# Patient Record
Sex: MALE | Race: WHITE
[De-identification: names, ages, dates, MRNs, and addresses within clinical notes are randomized per-mention and may not be internally consistent; named-entity substitution may affect disease eponyms.]

---

## 2019-04-15 ENCOUNTER — HOSPITAL ENCOUNTER (OUTPATIENT)
Dept: HOSPITAL 61 - KCIC | Age: 39
Discharge: HOME | End: 2019-04-15
Attending: PHYSICIAN ASSISTANT
Payer: OTHER GOVERNMENT

## 2019-04-15 DIAGNOSIS — Z87.891: ICD-10-CM

## 2019-04-15 DIAGNOSIS — S46.011A: ICD-10-CM
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
## 2019-04-15 DIAGNOSIS — M75.41: ICD-10-CM

## 2019-04-15 DIAGNOSIS — M75.51: ICD-10-CM
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
## 2019-04-15 DIAGNOSIS — S43.432A: ICD-10-CM
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
## 2019-04-15 DIAGNOSIS — S43.431A: Primary | ICD-10-CM

## 2019-04-15 DIAGNOSIS — M75.21: ICD-10-CM

## 2019-04-15 PROCEDURE — 73040 CONTRAST X-RAY OF SHOULDER: CPT

## 2019-04-15 PROCEDURE — 23350 INJECTION FOR SHOULDER X-RAY: CPT

## 2019-04-15 PROCEDURE — A9585 GADOBUTROL INJECTION: HCPCS

## 2019-04-15 PROCEDURE — 73222 MRI JOINT UPR EXTREM W/DYE: CPT

## 2019-04-15 NOTE — [facility]
Clinical indications: Left shoulder pain. Recurrent left shoulder 

dislocations. Decreased range of motion.

 

FLUOROSCOPIC GUIDED LEFT SHOULDER ARTHROGRAM INJECTION

 

Procedure: The procedure and possible complications including bleeding and

infection were explained. The patient provided both verbal and written 

consent. A timeout was performed confirming the name of the patient and 

date of birth and the procedure and side of the procedure. The anterior 

aspect of the left shoulder was prepped and draped in the usual sterile 

fashion with ChloraPrep. A total of 3 cc of 1% lidocaine was utilized for 

local anesthesia. Using sterile technique and fluoroscopic guidance, a 

22-gauge spinal needle was directed into the left glenohumeral joint from 

an anterior approach. A solution containing 10 cc of sterile normal saline

and 5 cc of Omnipaque 300 and 5 cc of 1% lidocaine and 0.1 cc of Gadavist 

was injected intra-articularly under fluoroscopic observation and a 

fluoroscopic spot view of the left shoulder was performed which confirmed 

intra-articular position of the contrast. The needle was removed and 

hemostasis was adequate. A sterile bandage was applied to the puncture 

site. One more fluoroscopic spot view of the left shoulder was performed. 

The patient tolerated the procedure well without complication and was sent

to the MRI suite for further imaging.

 

Total fluoroscopic time: 25 seconds. 2 fluoroscopic spot images were 

performed.

 

Impression: Fluoroscopic guided left shoulder injection was performed 

prior to an MRI study.

 

 

 

MRI LEFT SHOULDER ARTHROGRAM

 

TECHNIQUE: After intra-articular injection of gadolinium as discussed 

above, post arthrogram MRI sequences of the left shoulder were performed 

in all 3 planes. An additional ABER sequence was performed.

 

FINDINGS: No partial articular surface tear or complete tear of the 

rotator cuff is seen. The tendon of long of the biceps is intact. There is

mild increased signal within it consistent with tendinosis. There is 

contrast seen extending into the superior labrum at the chondral labral 

junction posteriorly and anteriorly consistent with a chondral labral SLAP

lesion. On the ABER sequence, there is a tear of the anterior glenoid 

labrum at the junction with the articular cartilage. In addition, more 

inferiorly the tear extends into the labral substance itself. No 

Hill-Sachs deformity is evident. The glenoid humeral joint is normally 

aligned. Articular cartilage of the glenohumeral joint is unremarkable and

no loose body is evident. No bone marrow edema or fracture or marrow 

infiltrative process is seen. No AC joint separation is evident. No 

significant AC joint spurring is evident. No subdeltoid or subacromial 

bursitis is seen.

 

IMPRESSION: No complete rotator cuff tear of the left shoulder.

 

SLAP lesions.

 

Electronically signed by: Sharan Martinez MD (4/15/2019 1:19 PM) 

[facility]

## 2019-04-15 NOTE — [facility]
Clinical indications: Right shoulder pain. History of dislocations. 

Decreased range of motion.

 

FLUOROSCOPIC-GUIDED RIGHT SHOULDER ARTHROGRAM INJECTION

 

Procedure: The procedure and possible complications including bleeding and

infection were explained. The patient provided both verbal and written 

consent. A timeout was performed confirming the name of the patient and 

date of birth and the procedure and side of the procedure. The anterior 

aspect of the right shoulder was prepped and draped in the usual sterile 

fashion with ChloraPrep. A total of 3 cc of 1% lidocaine was utilized for 

local anesthesia. Using sterile technique and fluoroscopic guidance, a 

22-gauge spinal needle was directed into the right glenohumeral joint from

an anterior approach. A solution containing 10 cc of sterile normal saline

and 5 cc of Omnipaque 350 and 5 cc of 1% lidocaine and 0.1 cc of Gadavist 

was injected intra-articularly under fluoroscopic observation and a 

fluoroscopic spot view of the right shoulder was performed which confirmed

intra-articular position of the contrast. The needle was removed and 

hemostasis was adequate. A sterile bandage was applied to the puncture 

site. Another fluoroscopic spot view was obtained. The patient tolerated 

the procedure well without complication and was sent to the MRI suite for 

further imaging.

 

Total fluoroscopic time: 14 seconds. 2 fluoroscopic spot images were 

performed.

 

IMPRESSION: Fluoroscopic guided right shoulder injection was performed 

prior to an MRI study.

 

 

 

MRI RIGHT SHOULDER ARTHROGRAM

 

TECHNIQUE: Noncontrast MRI sequences of the right shoulder were performed 

in all 3 planes. An additional ABER sequence was obtained.

 

FINDINGS: There is a partial articular surface tear of the lateral aspect 

of the supraspinatus tendon anteriorly. This extends through 75 % of the 

thickness of the tendon. No complete rotator cuff tear is evident 

otherwise. There is a tear of the superior aspect of the subscapularis 

tendon at the insertion onto the lesser tubercle. The transverse ligament 

attachment here is torn as well. This results in mild medial subluxation 

of the tendon of long of the biceps within the upper bicipital groove. 

Mild tendinosis of this portion of the biceps tendon is seen. Otherwise 

this tendon is intact. No muscle atrophy is evident. Mild subdeltoid and 

subacromial bursitis is seen. There is mild degenerative osteoarthritis 

and spurring of the AC joint. A type III acromial process is seen. These 

findings may impinge the acromial humeral space. Mild chronic cystic 

change of the lateral aspect of the humeral head is seen secondary to 

chronic impingement. There is a SLAP lesion extending anteriorly and 

posteriorly. It extends posteriorly down through to the mid to lower 

aspect of the glenoid. Anteriorly, it extends down to the mid to lower 

aspect as well best seen in the ABER sequence. No spinoglenoid notch 

ganglion cyst is seen.The glenohumeral joint is normally aligned 

otherwise. No loose body is seen. No bone marrow edema or fracture or 

marrow infiltrative process is seen.

 

IMPRESSION: Partial articular surface tear of the anterior portion of the 

lateral aspect of the supraspinatus tendon at the attachment to the 

greater tubercle. This extends through at least 75% of the thickness of 

the tendon. No complete rotator cuff tear. Impingement of the acromial 

humeral space as discussed above. Mild subdeltoid/subacromial bursitis.

 

There is a tear of the superior aspect of the subscapularis tendon 

attachment to the lesser tubercle. The tear extends through the transverse

ligament extension as well resulting in medial subluxation of the biceps 

tendon within the upper bicipital groove. Mild chronic tendinosis of the 

biceps tendon here.

 

SLAP lesion which extends through approximately 270 degrees of the upper 

aspect of the glenoid.

 

Electronically signed by: Sharan Martinez MD (4/15/2019 2:46 PM) 

[facility]

## 2019-04-15 NOTE — [facility]
Clinical indications: Right shoulder pain. History of dislocations. 

Decreased range of motion.

 

FLUOROSCOPIC-GUIDED RIGHT SHOULDER ARTHROGRAM INJECTION

 

Procedure: The procedure and possible complications including bleeding and

infection were explained. The patient provided both verbal and written 

consent. A timeout was performed confirming the name of the patient and 

date of birth and the procedure and side of the procedure. The anterior 

aspect of the right shoulder was prepped and draped in the usual sterile 

fashion with ChloraPrep. A total of 3 cc of 1% lidocaine was utilized for 

local anesthesia. Using sterile technique and fluoroscopic guidance, a 

22-gauge spinal needle was directed into the right glenohumeral joint from

an anterior approach. A solution containing 10 cc of sterile normal saline

and 5 cc of Omnipaque 350 and 5 cc of 1% lidocaine and 0.1 cc of Gadavist 

was injected intra-articularly under fluoroscopic observation and a 

fluoroscopic spot view of the right shoulder was performed which confirmed

intra-articular position of the contrast. The needle was removed and 

hemostasis was adequate. A sterile bandage was applied to the puncture 

site. Another fluoroscopic spot view was obtained. The patient tolerated 

the procedure well without complication and was sent to the MRI suite for 

further imaging.

 

Total fluoroscopic time: 14 seconds. 2 fluoroscopic spot images were 

performed.

 

IMPRESSION: Fluoroscopic guided right shoulder injection was performed 

prior to an MRI study.

 

 

 

MRI RIGHT SHOULDER ARTHROGRAM

 

TECHNIQUE: Noncontrast MRI sequences of the right shoulder were performed 

in all 3 planes. An additional ABER sequence was obtained.

 

FINDINGS: There is a partial articular surface tear of the lateral aspect 

of the supraspinatus tendon anteriorly. This extends through 75 % of the 

thickness of the tendon. No complete rotator cuff tear is evident 

otherwise. There is a tear of the superior aspect of the subscapularis 

tendon at the insertion onto the lesser tubercle. The transverse ligament 

attachment here is torn as well. This results in mild medial subluxation 

of the tendon of long of the biceps within the upper bicipital groove. 

Mild tendinosis of this portion of the biceps tendon is seen. Otherwise 

this tendon is intact. No muscle atrophy is evident. Mild subdeltoid and 

subacromial bursitis is seen. There is mild degenerative osteoarthritis 

and spurring of the AC joint. A type III acromial process is seen. These 

findings may impinge the acromial humeral space. Mild chronic cystic 

change of the lateral aspect of the humeral head is seen secondary to 

chronic impingement. There is a SLAP lesion extending anteriorly and 

posteriorly. It extends posteriorly down through to the mid to lower 

aspect of the glenoid. Anteriorly, it extends down to the mid to lower 

aspect as well best seen in the ABER sequence. No spinoglenoid notch 

ganglion cyst is seen.The glenohumeral joint is normally aligned 

otherwise. No loose body is seen. No bone marrow edema or fracture or 

marrow infiltrative process is seen.

 

IMPRESSION: Partial articular surface tear of the anterior portion of the 

lateral aspect of the supraspinatus tendon at the attachment to the 

greater tubercle. This extends through at least 75% of the thickness of 

the tendon. No complete rotator cuff tear. Impingement of the acromial 

humeral space as discussed above. Mild subdeltoid/subacromial bursitis.

 

There is a tear of the superior aspect of the subscapularis tendon 

attachment to the lesser tubercle. The tear extends through the transverse

ligament extension as well resulting in medial subluxation of the biceps 

tendon within the upper bicipital groove. Mild chronic tendinosis of the 

biceps tendon here.

 

SLAP lesion which extends through approximately 270 degrees of the upper 

aspect of the glenoid.

 

Electronically signed by: Sharan Martinez MD (4/15/2019 2:46 PM) 

[facility]

## 2019-04-15 NOTE — [facility]
Clinical indications: Left shoulder pain. Recurrent left shoulder 

dislocations. Decreased range of motion.

 

FLUOROSCOPIC GUIDED LEFT SHOULDER ARTHROGRAM INJECTION

 

Procedure: The procedure and possible complications including bleeding and

infection were explained. The patient provided both verbal and written 

consent. A timeout was performed confirming the name of the patient and 

date of birth and the procedure and side of the procedure. The anterior 

aspect of the left shoulder was prepped and draped in the usual sterile 

fashion with ChloraPrep. A total of 3 cc of 1% lidocaine was utilized for 

local anesthesia. Using sterile technique and fluoroscopic guidance, a 

22-gauge spinal needle was directed into the left glenohumeral joint from 

an anterior approach. A solution containing 10 cc of sterile normal saline

and 5 cc of Omnipaque 300 and 5 cc of 1% lidocaine and 0.1 cc of Gadavist 

was injected intra-articularly under fluoroscopic observation and a 

fluoroscopic spot view of the left shoulder was performed which confirmed 

intra-articular position of the contrast. The needle was removed and 

hemostasis was adequate. A sterile bandage was applied to the puncture 

site. One more fluoroscopic spot view of the left shoulder was performed. 

The patient tolerated the procedure well without complication and was sent

to the MRI suite for further imaging.

 

Total fluoroscopic time: 25 seconds. 2 fluoroscopic spot images were 

performed.

 

Impression: Fluoroscopic guided left shoulder injection was performed 

prior to an MRI study.

 

 

 

MRI LEFT SHOULDER ARTHROGRAM

 

TECHNIQUE: After intra-articular injection of gadolinium as discussed 

above, post arthrogram MRI sequences of the left shoulder were performed 

in all 3 planes. An additional ABER sequence was performed.

 

FINDINGS: No partial articular surface tear or complete tear of the 

rotator cuff is seen. The tendon of long of the biceps is intact. There is

mild increased signal within it consistent with tendinosis. There is 

contrast seen extending into the superior labrum at the chondral labral 

junction posteriorly and anteriorly consistent with a chondral labral SLAP

lesion. On the ABER sequence, there is a tear of the anterior glenoid 

labrum at the junction with the articular cartilage. In addition, more 

inferiorly the tear extends into the labral substance itself. No 

Hill-Sachs deformity is evident. The glenoid humeral joint is normally 

aligned. Articular cartilage of the glenohumeral joint is unremarkable and

no loose body is evident. No bone marrow edema or fracture or marrow 

infiltrative process is seen. No AC joint separation is evident. No 

significant AC joint spurring is evident. No subdeltoid or subacromial 

bursitis is seen.

 

IMPRESSION: No complete rotator cuff tear of the left shoulder.

 

SLAP lesions.

 

Electronically signed by: Sharan Martinez MD (4/15/2019 1:19 PM) 

[facility]